# Patient Record
Sex: FEMALE | Race: WHITE | NOT HISPANIC OR LATINO | Employment: OTHER | URBAN - METROPOLITAN AREA
[De-identification: names, ages, dates, MRNs, and addresses within clinical notes are randomized per-mention and may not be internally consistent; named-entity substitution may affect disease eponyms.]

---

## 2017-03-28 ENCOUNTER — FOLLOW UP (OUTPATIENT)
Dept: URBAN - METROPOLITAN AREA CLINIC 26 | Facility: CLINIC | Age: 72
End: 2017-03-28

## 2017-03-28 VITALS
WEIGHT: 120 LBS | BODY MASS INDEX: 22.37 KG/M2 | SYSTOLIC BLOOD PRESSURE: 116 MMHG | DIASTOLIC BLOOD PRESSURE: 76 MMHG | HEART RATE: 84 BPM | HEIGHT: 61.5 IN

## 2017-03-28 DIAGNOSIS — H43.392: ICD-10-CM

## 2017-03-28 DIAGNOSIS — H35.371: ICD-10-CM

## 2017-03-28 DIAGNOSIS — H35.3111: ICD-10-CM

## 2017-03-28 DIAGNOSIS — H43.811: ICD-10-CM

## 2017-03-28 DIAGNOSIS — H43.823: ICD-10-CM

## 2017-03-28 DIAGNOSIS — H35.3122: ICD-10-CM

## 2017-03-28 PROCEDURE — G8427 DOCREV CUR MEDS BY ELIG CLIN: HCPCS

## 2017-03-28 PROCEDURE — 1036F TOBACCO NON-USER: CPT

## 2017-03-28 PROCEDURE — 92014 COMPRE OPH EXAM EST PT 1/>: CPT

## 2017-03-28 PROCEDURE — 4177F TALK PT/CRGVR RE AREDS PREV: CPT

## 2017-03-28 PROCEDURE — 2019F DILATED MACUL EXAM DONE: CPT

## 2017-03-28 PROCEDURE — 92250 FUNDUS PHOTOGRAPHY W/I&R: CPT

## 2017-03-28 PROCEDURE — 92235 FLUORESCEIN ANGRPH MLTIFRAME: CPT

## 2017-03-28 ASSESSMENT — TONOMETRY
OS_IOP_MMHG: 10
OD_IOP_MMHG: 10

## 2017-03-28 ASSESSMENT — VISUAL ACUITY
OS_CC: 20/20-2
OD_CC: 20/20-2

## 2017-09-27 ENCOUNTER — OFFICE VISIT (OUTPATIENT)
Dept: FAMILY MEDICINE | Facility: CLINIC | Age: 72
End: 2017-09-27
Payer: MEDICARE

## 2017-09-27 VITALS
TEMPERATURE: 98.4 F | BODY MASS INDEX: 24.39 KG/M2 | SYSTOLIC BLOOD PRESSURE: 134 MMHG | DIASTOLIC BLOOD PRESSURE: 78 MMHG | HEIGHT: 59 IN | WEIGHT: 121 LBS | HEART RATE: 72 BPM

## 2017-09-27 DIAGNOSIS — R30.0 DYSURIA: Primary | ICD-10-CM

## 2017-09-27 LAB
ALBUMIN UR-MCNC: NEGATIVE MG/DL
APPEARANCE UR: CLEAR
BACTERIA #/AREA URNS HPF: ABNORMAL /HPF
BILIRUB UR QL STRIP: NEGATIVE
COLOR UR AUTO: YELLOW
GLUCOSE UR STRIP-MCNC: NEGATIVE MG/DL
HGB UR QL STRIP: NEGATIVE
KETONES UR STRIP-MCNC: NEGATIVE MG/DL
LEUKOCYTE ESTERASE UR QL STRIP: ABNORMAL
NITRATE UR QL: NEGATIVE
NON-SQ EPI CELLS #/AREA URNS LPF: ABNORMAL /LPF
PH UR STRIP: 6 PH (ref 5–7)
RBC #/AREA URNS AUTO: ABNORMAL /HPF
SOURCE: ABNORMAL
SP GR UR STRIP: <=1.005 (ref 1–1.03)
SPECIMEN SOURCE: NORMAL
UROBILINOGEN UR STRIP-ACNC: 0.2 EU/DL (ref 0.2–1)
WBC #/AREA URNS AUTO: ABNORMAL /HPF
WET PREP SPEC: NORMAL

## 2017-09-27 PROCEDURE — 87591 N.GONORRHOEAE DNA AMP PROB: CPT | Performed by: PHYSICIAN ASSISTANT

## 2017-09-27 PROCEDURE — 87086 URINE CULTURE/COLONY COUNT: CPT | Performed by: PHYSICIAN ASSISTANT

## 2017-09-27 PROCEDURE — 87491 CHLMYD TRACH DNA AMP PROBE: CPT | Performed by: PHYSICIAN ASSISTANT

## 2017-09-27 PROCEDURE — 81001 URINALYSIS AUTO W/SCOPE: CPT | Performed by: PHYSICIAN ASSISTANT

## 2017-09-27 PROCEDURE — 87210 SMEAR WET MOUNT SALINE/INK: CPT | Performed by: PHYSICIAN ASSISTANT

## 2017-09-27 PROCEDURE — 99213 OFFICE O/P EST LOW 20 MIN: CPT | Performed by: PHYSICIAN ASSISTANT

## 2017-09-27 NOTE — PATIENT INSTRUCTIONS
Based on your medical history and these are the current health maintenance or preventive care services that you are due for (some may have been done at this visit)  Health Maintenance Due   Topic Date Due     TETANUS IMMUNIZATION (SYSTEM ASSIGNED)  01/26/1962     LIPID SCREEN Q5 YR FEMALE (SYSTEM ASSIGNED)  01/26/1989     MAMMO SCREEN Q2 YR (SYSTEM ASSIGNED)  01/26/1994     COLON CANCER SCREEN (SYSTEM ASSIGNED)  01/26/1994     ADVANCE DIRECTIVE PLANNING Q5 YRS  01/26/1999     FALL RISK ASSESSMENT  01/26/2009     DEXA SCAN SCREENING (SYSTEM ASSIGNED)  01/26/2009     PNEUMOCOCCAL (1 of 2 - PCV13) 01/26/2009     INFLUENZA VACCINE (SYSTEM ASSIGNED)  09/01/2017         At [facility], we strive to deliver an exceptional experience to you, every time we see you.    If you receive a survey in the mail, please send us back your thoughts. We really do value your feedback.    Your care team's suggested websites for health information:  Www.[employer] : Up to date and easily searchable information on multiple topics.  Www.medlineplus.gov : medication info, interactive tutorials, watch real surgeries online  Www.familydoctor.org : good info from the Academy of Family Physicians  Www.cdc.gov : public health info, travel advisories, epidemics (H1N1)  Www.aap.org : children's health info, normal development, vaccinations  Www.health.[facility].[place].us : [place] dept of health, public health issues in [place], N1N1    How to contact your care team:   Team Dan[facility]Spirit [phone]         Pharmacy [phone]    Dr. Edwards, Ewa Parmar PA-C, Dr. Lee, Monet Lomax APRN CNP, Ellie Cabello PA-C, Dr. Dexter, and RUSSEL Meeks CNP    Team RN: Ashley      Clinic hours  M-Th 7 am-7 pm   Fri 7 am-5 pm.   Urgent care M-F 11 am-9 pm,   Sat/Sun 9 am-5 pm.  Pharmacy M-Th 8 am-8 pm Fri 8 am-6 pm  Sat/Sun 9 am-5 pm.     All password changes, disabled accounts, or ID changes in MyChart/MyHealth will be done by our  Access Services Department.    If you need help with your account or password, call: [phone]. Clinic staff no longer has the ability to change passwords.

## 2017-09-27 NOTE — MR AVS SNAPSHOT
After Visit Summary   9/27/2017    Annette Fink    MRN: [id number]           Patient Information     Date Of Birth          1945        Visit Information        Provider Department      9/27/2017 11:00 AM Flori Mejia PA-C [facility]        Today's Diagnoses     Dysuria    -  1      Care Instructions    Based on your medical history and these are the current health maintenance or preventive care services that you are due for (some may have been done at this visit)  Health Maintenance Due   Topic Date Due     TETANUS IMMUNIZATION (SYSTEM ASSIGNED)  01/26/1962     LIPID SCREEN Q5 YR FEMALE (SYSTEM ASSIGNED)  01/26/1989     MAMMO SCREEN Q2 YR (SYSTEM ASSIGNED)  01/26/1994     COLON CANCER SCREEN (SYSTEM ASSIGNED)  01/26/1994     ADVANCE DIRECTIVE PLANNING Q5 YRS  01/26/1999     FALL RISK ASSESSMENT  01/26/2009     DEXA SCAN SCREENING (SYSTEM ASSIGNED)  01/26/2009     PNEUMOCOCCAL (1 of 2 - PCV13) 01/26/2009     INFLUENZA VACCINE (SYSTEM ASSIGNED)  09/01/2017         At [facility], we strive to deliver an exceptional experience to you, every time we see you.    If you receive a survey in the mail, please send us back your thoughts. We really do value your feedback.    Your care team's suggested websites for health information:  Www.[employer].org : Up to date and easily searchable information on multiple topics.  Www.medlineplus.gov : medication info, interactive tutorials, watch real surgeries online  Www.familydoctor.org : good info from the Academy of Family Physicians  Www.cdc.gov : public health info, travel advisories, epidemics (H1N1)  Www.aap.org : children's health info, normal development, vaccinations  Www.health.state[place]us : [place] dept of health, public health issues in [place], N1N1    How to contact your care team:   Team Danii[facility]irit [phone]         Pharmacy [phone]    Dr. Edwards, Ewa Parmar PA-C, Dr. Lee,  "Monet GOODE CNP, Ellie Cabello PA-C, Dr. Dexter, and RUSSEL Meeks CNP    Team RN: Ashley      Clinic hours  M-Th 7 am-7 pm   Fri 7 am-5 pm.   Urgent care M-F 11 am-9 pm,   Sat/Sun 9 am-5 pm.  Pharmacy M-Th 8 am-8 pm Fri 8 am-6 pm  Sat/Sun 9 am-5 pm.     All password changes, disabled accounts, or ID changes in [employer]/MyHealth will be done by our Access Services Department.    If you need help with your account or password, call: [phone]. Clinic staff no longer has the ability to change passwords.             Follow-ups after your visit        Who to contact     If you have questions or need follow up information about today's clinic visit or your schedule please contact [facility] directly at [phone].  Normal or non-critical lab and imaging results will be communicated to you by Climateminderhart, letter or phone within 4 business days after the clinic has received the results. If you do not hear from us within 7 days, please contact the clinic through Kytet or phone. If you have a critical or abnormal lab result, we will notify you by phone as soon as possible.  Submit refill requests through [employer] or call your pharmacy and they will forward the refill request to us. Please allow 3 business days for your refill to be completed.          Additional Information About Your Visit        [employer] Information     [employer] lets you send messages to your doctor, view your test results, renew your prescriptions, schedule appointments and more. To sign up, go to [email or web address] . Click on \"Log in\" on the left side of the screen, which will take you to the Welcome page. Then click on \"Sign up Now\" on the right side of the page.     You will be asked to enter the access code listed below, as well as some personal information. Please follow the directions to create your username and password.     Your access code is: [email or web address]  Expires: 12/26/2017 11:57 AM     Your access code " "will  in 90 days. If you need help or a new code, please call your [facility] clinic or [phone].        Care EveryWhere ID     This is your Care EveryWhere ID. This could be used by other organizations to access your [facility] medical records  [id number]        Your Vitals Were     Pulse Temperature Height Breastfeeding? BMI (Body Mass Index)       72 98.4  F (36.9  C) (Oral) 4' 11.25\" (1.505 m) No 24.23 kg/m2        Blood Pressure from Last 3 Encounters:   17 134/78    Weight from Last 3 Encounters:   17 121 lb (54.9 kg)              We Performed the Following     *UA reflex to Microscopic and Culture ([place] and [facility] (except [place] and [place])     CHLAMYDIA TRACHOMATIS PCR     NEISSERIA GONORRHOEA PCR     Urine Culture Aerobic Bacterial     Urine Microscopic     Wet prep        Primary Care Provider    Provider Outside       No address on file        Equal Access to Services     [facility]: Hadii aad ku hadasho Soomaali, waaxda luqadaha, qaybta kaalmada adeegyada, waxay jenniferin haykathy walls . So [facility] [phone].    ATENCIÓN: Si habla español, tiene a tavares disposición servicios gratuitos de asistencia lingüística. Llame al [phone].    We comply with applicable federal civil rights laws and [place] laws. We do not discriminate on the basis of race, color, national origin, age, disability sex, sexual orientation or gender identity.            Thank you!     Thank you for choosing [facility]  for your care. Our goal is always to provide you with excellent care. Hearing back from our patients is one way we can continue to improve our services. Please take a few minutes to complete the written survey that you may receive in the mail after your visit with us. Thank you!             Your Updated Medication List - Protect others around you: Learn how to safely use, store and throw away your medicines at www.disposemymeds.org.          This " list is accurate as of: 9/27/17 11:57 AM.  Always use your most recent med list.                   Brand Name Dispense Instructions for use Diagnosis    LISINOPRIL PO      Take 5 mg by mouth        MOBIC PO      Take 15 mg by mouth        PRILOSEC PO      Take 20 mg by mouth        SIMVASTATIN PO      Take 40 mg by mouth

## 2017-09-27 NOTE — NURSING NOTE
"Chief Complaint   Patient presents with     STD       Initial BP (!) 162/94 (BP Location: Right arm, Patient Position: Sitting, Cuff Size: Adult Regular)  Pulse 72  Temp 98.4  F (36.9  C) (Oral)  Ht 4' 11.25\" (1.505 m)  Wt 121 lb (54.9 kg)  Breastfeeding? No  BMI 24.23 kg/m2 Estimated body mass index is 24.23 kg/(m^2) as calculated from the following:    Height as of this encounter: 4' 11.25\" (1.505 m).    Weight as of this encounter: 121 lb (54.9 kg).  Medication Reconciliation: complete   Flavio JIMÉNEZ      "

## 2017-09-28 LAB
BACTERIA SPEC CULT: NORMAL
SPECIMEN SOURCE: NORMAL

## 2017-09-29 LAB
C TRACH DNA SPEC QL NAA+PROBE: NEGATIVE
N GONORRHOEA DNA SPEC QL NAA+PROBE: NEGATIVE
SPECIMEN SOURCE: NORMAL
SPECIMEN SOURCE: NORMAL

## 2018-03-30 ENCOUNTER — FOLLOW UP (OUTPATIENT)
Dept: URBAN - METROPOLITAN AREA CLINIC 26 | Facility: CLINIC | Age: 73
End: 2018-03-30

## 2018-03-30 VITALS
SYSTOLIC BLOOD PRESSURE: 126 MMHG | HEART RATE: 90 BPM | DIASTOLIC BLOOD PRESSURE: 82 MMHG | HEIGHT: 60 IN | BODY MASS INDEX: 23.75 KG/M2 | WEIGHT: 121 LBS

## 2018-03-30 DIAGNOSIS — H43.392: ICD-10-CM

## 2018-03-30 DIAGNOSIS — H43.811: ICD-10-CM

## 2018-03-30 DIAGNOSIS — H35.3122: ICD-10-CM

## 2018-03-30 DIAGNOSIS — H43.823: ICD-10-CM

## 2018-03-30 DIAGNOSIS — H35.3111: ICD-10-CM

## 2018-03-30 DIAGNOSIS — H35.371: ICD-10-CM

## 2018-03-30 PROCEDURE — 92235 FLUORESCEIN ANGRPH MLTIFRAME: CPT

## 2018-03-30 PROCEDURE — 92014 COMPRE OPH EXAM EST PT 1/>: CPT

## 2018-03-30 PROCEDURE — 92134 CPTRZ OPH DX IMG PST SGM RTA: CPT

## 2018-03-30 PROCEDURE — 92250 FUNDUS PHOTOGRAPHY W/I&R: CPT

## 2018-03-30 ASSESSMENT — VISUAL ACUITY
OS_CC: 20/20-1
OD_CC: 20/20-1

## 2018-03-30 ASSESSMENT — TONOMETRY
OS_IOP_MMHG: 12
OD_IOP_MMHG: 12

## 2018-08-24 NOTE — PROGRESS NOTES
"  SUBJECTIVE:   Annette Fink is a 72 year old female who presents to clinic today for the following health issues:        Vaginal Symptoms      Duration: last week    Description  itching and burning with urination    Intensity:  moderate    Accompanying signs and symptoms (fever/dysuria/abdominal or back pain): Itching and burning      History  Sexually active: yes, more than one panter in the last year  Possibility of pregnancy: No  Recent antibiotic use: no     Precipitating or alleviating factors: None    Therapies tried and outcome: none   Outcome:     Problem list and histories reviewed & adjusted, as indicated.  Additional history: Patient denies dyspareunia.    ROS:  Constitutional, HEENT, cardiovascular, pulmonary, gi and gu systems are negative, except as otherwise noted.      OBJECTIVE:   /78  Pulse 72  Temp 98.4  F (36.9  C) (Oral)  Ht 4' 11.25\" (1.505 m)  Wt 121 lb (54.9 kg)  Breastfeeding? No  BMI 24.23 kg/m2  Body mass index is 24.23 kg/(m^2).    Total visit time is 15 Minutes with > 12 Minutes spent in care and consultation regarding Vaginitis etiol, labs and follow up plan.      Diagnostic Test Results:  Results for orders placed or performed in visit on 09/27/17 (from the past 24 hour(s))   *UA reflex to Microscopic and Culture ([facility] (except [place] and [place])   Result Value Ref Range    Color Urine Yellow     Appearance Urine Clear     Glucose Urine Negative NEG^Negative mg/dL    Bilirubin Urine Negative NEG^Negative    Ketones Urine Negative NEG^Negative mg/dL    Specific Gravity Urine <=1.005 1.003 - 1.035    Blood Urine Negative NEG^Negative    pH Urine 6.0 5.0 - 7.0 pH    Protein Albumin Urine Negative NEG^Negative mg/dL    Urobilinogen Urine 0.2 0.2 - 1.0 EU/dL    Nitrite Urine Negative NEG^Negative    Leukocyte Esterase Urine Trace (A) NEG^Negative    Source Midstream Urine    Wet prep   Result Value Ref Range    Specimen Description Vagina  "    Wet Prep No Trichomonas seen     Wet Prep No clue cells seen     Wet Prep No yeast seen    Urine Microscopic   Result Value Ref Range    WBC Urine 2-5 (A) OTO2^O - 2 /HPF    RBC Urine O - 2 OTO2^O - 2 /HPF    Squamous Epithelial /LPF Urine Few FEW^Few /LPF    Bacteria Urine Few (A) NEG^Negative /HPF       ASSESSMENT/PLAN:   1. Dysuria  - NEISSERIA GONORRHOEA PCR  - CHLAMYDIA TRACHOMATIS PCR  - *UA reflex to Microscopic and Culture ([place] and [facility] (except [place] and [place])  - Wet prep  - Urine Microscopic  - Urine Culture Aerobic Bacterial    Follow up on labs  Consider vaginal estrogens if symptoms persist  Patient amenable to this follow up plan.     Flori Mejia PA-C  [facility] [place]   normal...

## 2019-02-25 ENCOUNTER — TRANSFERRED RECORDS (OUTPATIENT)
Dept: HEALTH INFORMATION MANAGEMENT | Facility: CLINIC | Age: 74
End: 2019-02-25

## 2019-03-29 ENCOUNTER — FOLLOW UP (OUTPATIENT)
Dept: URBAN - METROPOLITAN AREA CLINIC 26 | Facility: CLINIC | Age: 74
End: 2019-03-29

## 2019-03-29 VITALS
HEIGHT: 60.5 IN | BODY MASS INDEX: 22.95 KG/M2 | WEIGHT: 120 LBS | SYSTOLIC BLOOD PRESSURE: 120 MMHG | HEART RATE: 64 BPM | DIASTOLIC BLOOD PRESSURE: 72 MMHG

## 2019-03-29 DIAGNOSIS — H35.3111: ICD-10-CM

## 2019-03-29 DIAGNOSIS — H43.823: ICD-10-CM

## 2019-03-29 DIAGNOSIS — H43.392: ICD-10-CM

## 2019-03-29 DIAGNOSIS — H35.371: ICD-10-CM

## 2019-03-29 DIAGNOSIS — H35.3122: ICD-10-CM

## 2019-03-29 DIAGNOSIS — H43.811: ICD-10-CM

## 2019-03-29 PROCEDURE — 92235 FLUORESCEIN ANGRPH MLTIFRAME: CPT

## 2019-03-29 PROCEDURE — 92250 FUNDUS PHOTOGRAPHY W/I&R: CPT

## 2019-03-29 PROCEDURE — 92014 COMPRE OPH EXAM EST PT 1/>: CPT

## 2019-03-29 ASSESSMENT — VISUAL ACUITY
OS_CC: 20/20
OD_CC: 20/20-2

## 2019-03-29 ASSESSMENT — TONOMETRY
OD_IOP_MMHG: 14
OS_IOP_MMHG: 12

## 2019-04-25 ENCOUNTER — TRANSFERRED RECORDS (OUTPATIENT)
Dept: HEALTH INFORMATION MANAGEMENT | Facility: CLINIC | Age: 74
End: 2019-04-25

## 2019-04-25 LAB
ALT SERPL-CCNC: 15 U/L (ref 7–52)
AST SERPL-CCNC: 21 U/L (ref 13–39)
CHOLEST SERPL-MCNC: 189 MG/DL (ref 20–180)
CREAT SERPL-MCNC: 0.8 MG/DL (ref 0.6–1.3)
GFR SERPL CREATININE-BSD FRML MDRD: 78 ML/MIN/1.73M2
GLUCOSE SERPL-MCNC: 78 MG/DL (ref 70–100)
HDLC SERPL-MCNC: 93 MG/DL (ref 23–92)
NONHDLC SERPL-MCNC: 78 MG/DL
POTASSIUM SERPL-SCNC: 4.8 MMOL/L (ref 3.5–5.5)
TRIGL SERPL-MCNC: 75 MG/DL (ref 30–150)

## 2019-05-02 ENCOUNTER — TRANSFERRED RECORDS (OUTPATIENT)
Dept: HEALTH INFORMATION MANAGEMENT | Facility: CLINIC | Age: 74
End: 2019-05-02

## 2019-08-02 ENCOUNTER — OFFICE VISIT (OUTPATIENT)
Dept: FAMILY MEDICINE | Facility: CLINIC | Age: 74
End: 2019-08-02
Payer: MEDICARE

## 2019-08-02 VITALS
OXYGEN SATURATION: 95 % | HEART RATE: 74 BPM | WEIGHT: 120 LBS | SYSTOLIC BLOOD PRESSURE: 137 MMHG | TEMPERATURE: 97.7 F | BODY MASS INDEX: 22.66 KG/M2 | HEIGHT: 61 IN | DIASTOLIC BLOOD PRESSURE: 79 MMHG

## 2019-08-02 DIAGNOSIS — I10 HYPERTENSION GOAL BP (BLOOD PRESSURE) < 140/80: Primary | ICD-10-CM

## 2019-08-02 LAB
ALBUMIN SERPL-MCNC: 3.8 G/DL (ref 3.4–5)
ALP SERPL-CCNC: 49 U/L (ref 40–150)
ALT SERPL W P-5'-P-CCNC: 23 U/L (ref 0–50)
ANION GAP SERPL CALCULATED.3IONS-SCNC: 6 MMOL/L (ref 3–14)
AST SERPL W P-5'-P-CCNC: 24 U/L (ref 0–45)
BASOPHILS # BLD AUTO: 0 10E9/L (ref 0–0.2)
BASOPHILS NFR BLD AUTO: 0.5 %
BILIRUB SERPL-MCNC: 0.3 MG/DL (ref 0.2–1.3)
BUN SERPL-MCNC: 13 MG/DL (ref 7–30)
CALCIUM SERPL-MCNC: 8.4 MG/DL (ref 8.5–10.1)
CHLORIDE SERPL-SCNC: 110 MMOL/L (ref 94–109)
CO2 SERPL-SCNC: 25 MMOL/L (ref 20–32)
CREAT SERPL-MCNC: 0.71 MG/DL (ref 0.52–1.04)
CREAT UR-MCNC: 47 MG/DL
DIFFERENTIAL METHOD BLD: ABNORMAL
EOSINOPHIL # BLD AUTO: 0.1 10E9/L (ref 0–0.7)
EOSINOPHIL NFR BLD AUTO: 3.8 %
ERYTHROCYTE [DISTWIDTH] IN BLOOD BY AUTOMATED COUNT: 12.7 % (ref 10–15)
GFR SERPL CREATININE-BSD FRML MDRD: 84 ML/MIN/{1.73_M2}
GLUCOSE SERPL-MCNC: 70 MG/DL (ref 70–99)
HCT VFR BLD AUTO: 38 % (ref 35–47)
HGB BLD-MCNC: 12.9 G/DL (ref 11.7–15.7)
LYMPHOCYTES # BLD AUTO: 1.3 10E9/L (ref 0.8–5.3)
LYMPHOCYTES NFR BLD AUTO: 36.4 %
MCH RBC QN AUTO: 32.9 PG (ref 26.5–33)
MCHC RBC AUTO-ENTMCNC: 33.9 G/DL (ref 31.5–36.5)
MCV RBC AUTO: 97 FL (ref 78–100)
MICROALBUMIN UR-MCNC: 16 MG/L
MICROALBUMIN/CREAT UR: 33.26 MG/G CR (ref 0–25)
MONOCYTES # BLD AUTO: 0.3 10E9/L (ref 0–1.3)
MONOCYTES NFR BLD AUTO: 8.2 %
NEUTROPHILS # BLD AUTO: 1.9 10E9/L (ref 1.6–8.3)
NEUTROPHILS NFR BLD AUTO: 51.1 %
PLATELET # BLD AUTO: 173 10E9/L (ref 150–450)
POTASSIUM SERPL-SCNC: 4.3 MMOL/L (ref 3.4–5.3)
PROT SERPL-MCNC: 6.5 G/DL (ref 6.8–8.8)
RBC # BLD AUTO: 3.92 10E12/L (ref 3.8–5.2)
SODIUM SERPL-SCNC: 141 MMOL/L (ref 133–144)
TSH SERPL DL<=0.005 MIU/L-ACNC: 1.55 MU/L (ref 0.4–4)
WBC # BLD AUTO: 3.7 10E9/L (ref 4–11)

## 2019-08-02 PROCEDURE — 93000 ELECTROCARDIOGRAM COMPLETE: CPT | Performed by: NURSE PRACTITIONER

## 2019-08-02 PROCEDURE — 84443 ASSAY THYROID STIM HORMONE: CPT | Performed by: NURSE PRACTITIONER

## 2019-08-02 PROCEDURE — 80053 COMPREHEN METABOLIC PANEL: CPT | Performed by: NURSE PRACTITIONER

## 2019-08-02 PROCEDURE — 99214 OFFICE O/P EST MOD 30 MIN: CPT | Performed by: NURSE PRACTITIONER

## 2019-08-02 PROCEDURE — 85025 COMPLETE CBC W/AUTO DIFF WBC: CPT | Performed by: NURSE PRACTITIONER

## 2019-08-02 PROCEDURE — 36415 COLL VENOUS BLD VENIPUNCTURE: CPT | Performed by: NURSE PRACTITIONER

## 2019-08-02 PROCEDURE — 82043 UR ALBUMIN QUANTITATIVE: CPT | Performed by: NURSE PRACTITIONER

## 2019-08-02 RX ORDER — HYDROCHLOROTHIAZIDE 12.5 MG/1
12.5 TABLET ORAL DAILY
Qty: 30 TABLET | Refills: 1 | Status: SHIPPED | OUTPATIENT
Start: 2019-08-02

## 2019-08-02 ASSESSMENT — PAIN SCALES - GENERAL: PAINLEVEL: NO PAIN (0)

## 2019-08-02 ASSESSMENT — MIFFLIN-ST. JEOR: SCORE: 968.76

## 2019-08-02 NOTE — LETTER
August 2, 2019      Annette Fink  [address]  [place] [address]      Annette,  Your lab results were normal. I have sent hydrochlorothiazide to the pharmacy. Please schedule lab visit in 2 weeks for your electrolytes to be rechecked. Follow up with your regular family doctor when you get back to [place]. Please let us know if you have any questions.  Thank you for allowing us to participate in your care.    Sincerely,   RUSSEL Glasgow CNP      Resulted Orders   Comprehensive metabolic panel (BMP + Alb, Alk Phos, ALT, AST, Total. Bili, TP)   Result Value Ref Range    Sodium 141 133 - 144 mmol/L    Potassium 4.3 3.4 - 5.3 mmol/L    Chloride 110 (H) 94 - 109 mmol/L    Carbon Dioxide 25 20 - 32 mmol/L    Anion Gap 6 3 - 14 mmol/L    Glucose 70 70 - 99 mg/dL    Urea Nitrogen 13 7 - 30 mg/dL    Creatinine 0.71 0.52 - 1.04 mg/dL    GFR Estimate 84 >60 mL/min/[1.73_m2]      Comment:      Non  GFR Calc  Starting 12/18/2018, serum creatinine based estimated GFR (eGFR) will be   calculated using the Chronic Kidney Disease Epidemiology Collaboration   (CKD-EPI) equation.      GFR Estimate If Black >90 >60 mL/min/[1.73_m2]      Comment:       GFR Calc  Starting 12/18/2018, serum creatinine based estimated GFR (eGFR) will be   calculated using the Chronic Kidney Disease Epidemiology Collaboration   (CKD-EPI) equation.      Calcium 8.4 (L) 8.5 - 10.1 mg/dL    Bilirubin Total 0.3 0.2 - 1.3 mg/dL    Albumin 3.8 3.4 - 5.0 g/dL    Protein Total 6.5 (L) 6.8 - 8.8 g/dL    Alkaline Phosphatase 49 40 - 150 U/L    ALT 23 0 - 50 U/L    AST 24 0 - 45 U/L   CBC with platelets differential   Result Value Ref Range    WBC 3.7 (L) 4.0 - 11.0 10e9/L    RBC Count 3.92 3.8 - 5.2 10e12/L    Hemoglobin 12.9 11.7 - 15.7 g/dL    Hematocrit 38.0 35.0 - 47.0 %    MCV 97 78 - 100 fl    MCH 32.9 26.5 - 33.0 pg    MCHC 33.9 31.5 - 36.5 g/dL    RDW 12.7 10.0 - 15.0 %    Platelet Count 173 150 - 450 10e9/L     % Neutrophils 51.1 %    % Lymphocytes 36.4 %    % Monocytes 8.2 %    % Eosinophils 3.8 %    % Basophils 0.5 %    Absolute Neutrophil 1.9 1.6 - 8.3 10e9/L    Absolute Lymphocytes 1.3 0.8 - 5.3 10e9/L    Absolute Monocytes 0.3 0.0 - 1.3 10e9/L    Absolute Eosinophils 0.1 0.0 - 0.7 10e9/L    Absolute Basophils 0.0 0.0 - 0.2 10e9/L    Diff Method Automated Method    TSH with free T4 reflex   Result Value Ref Range    TSH 1.55 0.40 - 4.00 mU/L   Albumin Random Urine Quantitative with Creat Ratio   Result Value Ref Range    Creatinine Urine 47 mg/dL    Albumin Urine mg/L 16 mg/L    Albumin Urine mg/g Cr 33.26 (H) 0 - 25 mg/g Cr

## 2019-08-02 NOTE — PROGRESS NOTES
Subjective     Annette Fink is a 74 year old female who presents to clinic today for the following health issues:    HPI   Hypertension Follow-up      Do you check your blood pressure regularly outside of the clinic? Yes     Are you following a low salt diet? Yes    Are your blood pressures ever more than 140 on the top number (systolic) OR more   than 90 on the bottom number (diastolic), for example 140/90? Yes    Amount of exercise or physical activity: 6-7 days/week for an average of 45-60 minutes    Problems taking medications regularly: No    Medication side effects: none    Diet: regular (no restrictions) and low salt      Lives in [place] and visiting for the summer. Had annual wellness visit in May and BP was a little high. She was on lisinopril 5 mg and it was increased to 20 mg. In the last month BP 130s-180s/70s-90s, but typically 170s-180s.. Plant based diet. No changes in diet. Exercises. No dizziness or lightheadedness. No chest pain or LE edema. No headache. Had a little shortness of breath when going up steep stairs in [place]. Bikes a lot.     There is no problem list on file for this patient.    History reviewed. No pertinent surgical history.    Social History     Tobacco Use     Smoking status: Never Smoker     Smokeless tobacco: Never Used   Substance Use Topics     Alcohol use: Not Currently     History reviewed. No pertinent family history.      Current Outpatient Medications   Medication Sig Dispense Refill     hydrochlorothiazide (HYDRODIURIL) 12.5 MG tablet Take 1 tablet (12.5 mg) by mouth daily 30 tablet 1     LISINOPRIL PO Take 20 mg by mouth        Meloxicam (MOBIC PO) Take 15 mg by mouth       Omeprazole (PRILOSEC PO) Take 20 mg by mouth       SIMVASTATIN PO Take 40 mg by mouth       Allergies   Allergen Reactions     Celebrex [Celecoxib]      Sulfa Drugs      BP Readings from Last 3 Encounters:   08/02/19 137/79   09/27/17 134/78    Wt Readings from Last 3 Encounters:   08/02/19  "54.4 kg (120 lb)   09/27/17 54.9 kg (121 lb)                    Reviewed and updated as needed this visit by Provider  Tobacco  Allergies  Meds  Problems  Med Hx  Surg Hx  Fam Hx         Review of Systems   ROS COMP: Constitutional, HEENT, cardiovascular, pulmonary, gi and gu systems are negative, except as otherwise noted.      Objective    /79 (BP Location: Right arm, Patient Position: Chair, Cuff Size: Adult Regular)   Pulse 74   Temp 97.7  F (36.5  C) (Oral)   Ht 1.537 m (5' 0.5\")   Wt 54.4 kg (120 lb)   SpO2 95%   BMI 23.05 kg/m    Body mass index is 23.05 kg/m .  Physical Exam   GENERAL: healthy, alert and no distress  EYES: Eyes grossly normal to inspection, PERRL and conjunctivae and sclerae normal  HENT: ear canals and TM's normal, nose and mouth without ulcers or lesions  NECK: no adenopathy, no asymmetry, masses, or scars and thyroid normal to palpation  RESP: lungs clear to auscultation - no rales, rhonchi or wheezes  CV: regular rate and rhythm, normal S1 S2, no S3 or S4, no murmur, click or rub, no peripheral edema and peripheral pulses strong  MS: no gross musculoskeletal defects noted, no edema  PSYCH: mentation appears normal, affect normal/bright    Diagnostic Test Results:  Results for orders placed or performed in visit on 08/02/19 (from the past 24 hour(s))   Comprehensive metabolic panel (BMP + Alb, Alk Phos, ALT, AST, Total. Bili, TP)   Result Value Ref Range    Sodium 141 133 - 144 mmol/L    Potassium 4.3 3.4 - 5.3 mmol/L    Chloride 110 (H) 94 - 109 mmol/L    Carbon Dioxide 25 20 - 32 mmol/L    Anion Gap 6 3 - 14 mmol/L    Glucose 70 70 - 99 mg/dL    Urea Nitrogen 13 7 - 30 mg/dL    Creatinine 0.71 0.52 - 1.04 mg/dL    GFR Estimate 84 >60 mL/min/[1.73_m2]    GFR Estimate If Black >90 >60 mL/min/[1.73_m2]    Calcium 8.4 (L) 8.5 - 10.1 mg/dL    Bilirubin Total 0.3 0.2 - 1.3 mg/dL    Albumin 3.8 3.4 - 5.0 g/dL    Protein Total 6.5 (L) 6.8 - 8.8 g/dL    Alkaline Phosphatase 49 " 40 - 150 U/L    ALT 23 0 - 50 U/L    AST 24 0 - 45 U/L   CBC with platelets differential   Result Value Ref Range    WBC 3.7 (L) 4.0 - 11.0 10e9/L    RBC Count 3.92 3.8 - 5.2 10e12/L    Hemoglobin 12.9 11.7 - 15.7 g/dL    Hematocrit 38.0 35.0 - 47.0 %    MCV 97 78 - 100 fl    MCH 32.9 26.5 - 33.0 pg    MCHC 33.9 31.5 - 36.5 g/dL    RDW 12.7 10.0 - 15.0 %    Platelet Count 173 150 - 450 10e9/L    % Neutrophils 51.1 %    % Lymphocytes 36.4 %    % Monocytes 8.2 %    % Eosinophils 3.8 %    % Basophils 0.5 %    Absolute Neutrophil 1.9 1.6 - 8.3 10e9/L    Absolute Lymphocytes 1.3 0.8 - 5.3 10e9/L    Absolute Monocytes 0.3 0.0 - 1.3 10e9/L    Absolute Eosinophils 0.1 0.0 - 0.7 10e9/L    Absolute Basophils 0.0 0.0 - 0.2 10e9/L    Diff Method Automated Method    TSH with free T4 reflex   Result Value Ref Range    TSH 1.55 0.40 - 4.00 mU/L   Albumin Random Urine Quantitative with Creat Ratio   Result Value Ref Range    Creatinine Urine 47 mg/dL    Albumin Urine mg/L 16 mg/L    Albumin Urine mg/g Cr 33.26 (H) 0 - 25 mg/g Cr   EKG 12-lead complete w/read - Clinics    Narrative    Sinus rhythm  -RSR(V1) - nondiagnostic  Rate 69      QTcH 411           Assessment & Plan     1. Hypertension goal BP (blood pressure) < 140/80  Not controlled - Will add hydrochlorothiazide. Follow up with primary care provider in [place] when she gets back in 2.5 weeks.   - EKG 12-lead complete w/read - Clinics  - Comprehensive metabolic panel (BMP + Alb, Alk Phos, ALT, AST, Total. Bili, TP)  - CBC with platelets differential  - TSH with free T4 reflex  - Albumin Random Urine Quantitative with Creat Ratio  - hydrochlorothiazide (HYDRODIURIL) 12.5 MG tablet; Take 1 tablet (12.5 mg) by mouth daily  Dispense: 30 tablet; Refill: 1       See Patient Instructions    Return in about 10 days (around 8/12/2019). for repeat lab work/BP check    The benefits, risks and potential side effects were discussed in detail. Black box warnings discussed as  relevant. All patient questions were answered. The patient was instructed to follow up immediately if any adverse reactions develop.    Return precautions discussed, including when to seek urgent/emergent care.    Patient verbalizes understanding and agrees with plan of care. Patient stable for discharge.      RUSSEL Glasgow [facility]

## 2019-08-02 NOTE — RESULT ENCOUNTER NOTE
Please CALL patient and be sure she got the message below  Annette,  Your lab results were normal. I have sent hydrochlorothiazide to the pharmacy. Please schedule lab visit in 2 weeks for your electrolytes to be rechecked. Follow up with your regular family doctor when you get back to [place]. Please let us know if you have any questions.  Thank you for allowing us to participate in your care.  RUSSEL Glasgow CNP

## 2019-08-02 NOTE — PATIENT INSTRUCTIONS
EKG looks good  Labs pending  We will likely add hydrochlorothiazide 12.5 mg if labs look normal this afternoon  Schedule lab visit for 10 days from now to recheck electrolytes (assuming we start hydrochlorothiazide)

## 2019-08-12 DIAGNOSIS — I10 HYPERTENSION GOAL BP (BLOOD PRESSURE) < 140/80: ICD-10-CM

## 2019-08-12 LAB
ANION GAP SERPL CALCULATED.3IONS-SCNC: 5 MMOL/L (ref 3–14)
BUN SERPL-MCNC: 17 MG/DL (ref 7–30)
CALCIUM SERPL-MCNC: 9 MG/DL (ref 8.5–10.1)
CHLORIDE SERPL-SCNC: 100 MMOL/L (ref 94–109)
CO2 SERPL-SCNC: 28 MMOL/L (ref 20–32)
CREAT SERPL-MCNC: 0.7 MG/DL (ref 0.52–1.04)
GFR SERPL CREATININE-BSD FRML MDRD: 84 ML/MIN/{1.73_M2}
GLUCOSE SERPL-MCNC: 88 MG/DL (ref 70–99)
POTASSIUM SERPL-SCNC: 4.1 MMOL/L (ref 3.4–5.3)
SODIUM SERPL-SCNC: 133 MMOL/L (ref 133–144)

## 2019-08-12 PROCEDURE — 36415 COLL VENOUS BLD VENIPUNCTURE: CPT | Performed by: NURSE PRACTITIONER

## 2019-08-12 PROCEDURE — 80048 BASIC METABOLIC PNL TOTAL CA: CPT | Performed by: NURSE PRACTITIONER

## 2019-08-12 NOTE — RESULT ENCOUNTER NOTE
Dear Annette    Your test results are attached. I am happy to let you know that they are stable.    The blood sugar is normal and you do not have diabetes. The kidneys are healthy. These results look very good!    Please contact me by KIS Grouphart if you have any questions about your labs or management.    Nunu Tian MD for Thelma Anguiano CNP Uric acid added

## 2020-03-11 ENCOUNTER — HEALTH MAINTENANCE LETTER (OUTPATIENT)
Age: 75
End: 2020-03-11

## 2020-12-27 ENCOUNTER — HEALTH MAINTENANCE LETTER (OUTPATIENT)
Age: 75
End: 2020-12-27

## 2021-04-25 ENCOUNTER — HEALTH MAINTENANCE LETTER (OUTPATIENT)
Age: 76
End: 2021-04-25

## 2021-10-09 ENCOUNTER — HEALTH MAINTENANCE LETTER (OUTPATIENT)
Age: 76
End: 2021-10-09

## 2022-01-12 ENCOUNTER — OFFICE VISIT (OUTPATIENT)
Dept: URBAN - METROPOLITAN AREA CLINIC 60 | Facility: CLINIC | Age: 77
End: 2022-01-12

## 2022-03-02 ENCOUNTER — OFFICE VISIT (OUTPATIENT)
Dept: URBAN - METROPOLITAN AREA SURGERY CENTER 4 | Facility: SURGERY CENTER | Age: 77
End: 2022-03-02

## 2022-03-16 ENCOUNTER — OFFICE VISIT (OUTPATIENT)
Dept: URBAN - METROPOLITAN AREA CLINIC 60 | Facility: CLINIC | Age: 77
End: 2022-03-16

## 2022-03-18 ENCOUNTER — OFFICE VISIT (OUTPATIENT)
Dept: URBAN - METROPOLITAN AREA CLINIC 60 | Facility: CLINIC | Age: 77
End: 2022-03-18

## 2022-05-21 ENCOUNTER — HEALTH MAINTENANCE LETTER (OUTPATIENT)
Age: 77
End: 2022-05-21

## 2022-07-09 ENCOUNTER — TELEPHONE ENCOUNTER (OUTPATIENT)
Dept: URBAN - METROPOLITAN AREA CLINIC 121 | Facility: CLINIC | Age: 77
End: 2022-07-09

## 2022-07-09 RX ORDER — MELOXICAM 15 MG/1
TABLET ORAL
Refills: 0 | OUTPATIENT
Start: 2021-11-08 | End: 2022-01-12

## 2022-07-09 RX ORDER — OMEPRAZOLE 20 MG/1
TABLET, DELAYED RELEASE ORAL ONCE A DAY
Refills: 0 | OUTPATIENT
Start: 2016-04-14 | End: 2022-01-12

## 2022-07-09 RX ORDER — OMEPRAZOLE 20 MG/1
CAPSULE, DELAYED RELEASE ORAL ONCE A DAY
Refills: 3 | OUTPATIENT
Start: 2005-12-07 | End: 2022-01-12

## 2022-07-09 RX ORDER — MELOXICAM 15 MG/1
TABLET ORAL ONCE A DAY
Refills: 0 | OUTPATIENT
Start: 2016-04-14 | End: 2022-01-12

## 2022-07-09 RX ORDER — SIMVASTATIN 40 MG/1
TABLET, FILM COATED ORAL
Refills: 0 | OUTPATIENT
Start: 2011-02-16 | End: 2016-04-14

## 2022-07-09 RX ORDER — ROSUVASTATIN CALCIUM 10 MG/1
TABLET, FILM COATED ORAL
Refills: 0 | OUTPATIENT
Start: 2021-11-08 | End: 2022-01-12

## 2022-07-09 RX ORDER — RABEPRAZOLE SODIUM 20 MG/1
TABLET, DELAYED RELEASE ORAL ONCE A DAY
Refills: 3 | OUTPATIENT
Start: 2005-11-28 | End: 2022-01-12

## 2022-07-09 RX ORDER — OMEPRAZOLE 20 MG/1
CAPSULE, DELAYED RELEASE ORAL
Refills: 0 | OUTPATIENT
Start: 2021-11-08 | End: 2022-01-12

## 2022-07-09 RX ORDER — LISINOPRIL 5 MG/1
TABLET ORAL
Refills: 0 | OUTPATIENT
Start: 2011-02-16 | End: 2016-04-14

## 2022-07-09 RX ORDER — IBANDRONATE SODIUM 150 MG
TABLET ORAL
Refills: 0 | OUTPATIENT
Start: 2011-02-16 | End: 2016-04-14

## 2022-07-09 RX ORDER — LISINOPRIL 5 MG/1
TABLET ORAL ONCE A DAY
Refills: 0 | OUTPATIENT
Start: 2016-04-14 | End: 2022-01-12

## 2022-07-09 RX ORDER — OMEPRAZOLE 20 MG/1
TABLET, DELAYED RELEASE ORAL
Refills: 0 | OUTPATIENT
Start: 2011-02-16 | End: 2016-04-14

## 2022-07-09 RX ORDER — MELOXICAM 15 MG/1
TABLET ORAL
Refills: 0 | OUTPATIENT
Start: 2011-02-16 | End: 2016-04-14

## 2022-07-09 RX ORDER — SIMVASTATIN 40 MG/1
TABLET, FILM COATED ORAL ONCE A DAY
Refills: 0 | OUTPATIENT
Start: 2016-04-14 | End: 2022-01-12

## 2022-07-09 RX ORDER — GABAPENTIN 300 MG/1
CAPSULE ORAL
Refills: 0 | OUTPATIENT
Start: 2021-12-14 | End: 2022-01-12

## 2022-07-10 ENCOUNTER — TELEPHONE ENCOUNTER (OUTPATIENT)
Dept: URBAN - METROPOLITAN AREA CLINIC 121 | Facility: CLINIC | Age: 77
End: 2022-07-10

## 2022-07-10 RX ORDER — GABAPENTIN 300 MG/1
CAPSULE ORAL THREE TIMES A DAY
Refills: 0 | Status: ACTIVE | COMMUNITY
Start: 2022-01-12

## 2022-07-10 RX ORDER — ACETAMINOPHEN/DIPHENHYDRAMINE 500MG-25MG
TABLET ORAL
Refills: 0 | Status: ACTIVE | COMMUNITY
Start: 2022-01-12

## 2022-07-10 RX ORDER — OMEPRAZOLE 20 MG/1
CAPSULE, DELAYED RELEASE ORAL
Refills: 0 | Status: ACTIVE | COMMUNITY
Start: 2022-01-12

## 2022-07-10 RX ORDER — MELOXICAM 15 MG/1
TABLET ORAL
Refills: 0 | Status: ACTIVE | COMMUNITY
Start: 2022-01-12

## 2022-07-10 RX ORDER — LISINOPRIL 5 MG/1
TABLET ORAL ONCE A DAY
Refills: 0 | Status: ACTIVE | COMMUNITY
Start: 2022-01-12

## 2022-07-10 RX ORDER — ELECTROLYTES/DEXTROSE
SOLUTION, ORAL ORAL
Refills: 0 | Status: ACTIVE | COMMUNITY
Start: 2022-01-12

## 2022-07-10 RX ORDER — ROSUVASTATIN CALCIUM 10 MG/1
TABLET, FILM COATED ORAL
Refills: 0 | Status: ACTIVE | COMMUNITY
Start: 2022-01-12

## 2022-09-17 ENCOUNTER — HEALTH MAINTENANCE LETTER (OUTPATIENT)
Age: 77
End: 2022-09-17

## 2023-06-04 ENCOUNTER — HEALTH MAINTENANCE LETTER (OUTPATIENT)
Age: 78
End: 2023-06-04